# Patient Record
Sex: MALE | Race: WHITE | NOT HISPANIC OR LATINO | Employment: FULL TIME
[De-identification: names, ages, dates, MRNs, and addresses within clinical notes are randomized per-mention and may not be internally consistent; named-entity substitution may affect disease eponyms.]

---

## 2018-10-18 ENCOUNTER — RECORDS - HEALTHEAST (OUTPATIENT)
Dept: ADMINISTRATIVE | Facility: OTHER | Age: 48
End: 2018-10-18

## 2018-11-05 ENCOUNTER — RECORDS - HEALTHEAST (OUTPATIENT)
Dept: ADMINISTRATIVE | Facility: OTHER | Age: 48
End: 2018-11-05

## 2018-12-03 ENCOUNTER — OFFICE VISIT - HEALTHEAST (OUTPATIENT)
Dept: INTERNAL MEDICINE | Facility: CLINIC | Age: 48
End: 2018-12-03

## 2018-12-03 ENCOUNTER — COMMUNICATION - HEALTHEAST (OUTPATIENT)
Dept: LAB | Facility: CLINIC | Age: 48
End: 2018-12-03

## 2018-12-03 DIAGNOSIS — N20.0 NEPHROLITHIASIS: ICD-10-CM

## 2018-12-03 DIAGNOSIS — Z23 NEED FOR TETANUS BOOSTER: ICD-10-CM

## 2018-12-03 DIAGNOSIS — Z01.810 PREOPERATIVE CARDIOVASCULAR EXAMINATION: ICD-10-CM

## 2018-12-03 DIAGNOSIS — Z13.220 LIPID SCREENING: ICD-10-CM

## 2018-12-03 DIAGNOSIS — Z78.9 NONSMOKER: ICD-10-CM

## 2018-12-03 LAB
ANION GAP SERPL CALCULATED.3IONS-SCNC: 10 MMOL/L (ref 5–18)
BUN SERPL-MCNC: 13 MG/DL (ref 8–22)
CALCIUM SERPL-MCNC: 9.5 MG/DL (ref 8.5–10.5)
CHLORIDE BLD-SCNC: 102 MMOL/L (ref 98–107)
CHOLEST SERPL-MCNC: 176 MG/DL
CO2 SERPL-SCNC: 28 MMOL/L (ref 22–31)
CREAT SERPL-MCNC: 0.87 MG/DL (ref 0.7–1.3)
ERYTHROCYTE [DISTWIDTH] IN BLOOD BY AUTOMATED COUNT: 11.3 % (ref 11–14.5)
FASTING STATUS PATIENT QL REPORTED: NORMAL
GFR SERPL CREATININE-BSD FRML MDRD: >60 ML/MIN/1.73M2
GLUCOSE BLD-MCNC: 92 MG/DL (ref 70–125)
HCT VFR BLD AUTO: 50.2 % (ref 40–54)
HDLC SERPL-MCNC: 67 MG/DL
HGB BLD-MCNC: 17.2 G/DL (ref 14–18)
LDLC SERPL CALC-MCNC: 94 MG/DL
MCH RBC QN AUTO: 31.1 PG (ref 27–34)
MCHC RBC AUTO-ENTMCNC: 34.3 G/DL (ref 32–36)
MCV RBC AUTO: 91 FL (ref 80–100)
PLATELET # BLD AUTO: 136 THOU/UL (ref 140–440)
PMV BLD AUTO: 8.5 FL (ref 7–10)
POTASSIUM BLD-SCNC: 4.2 MMOL/L (ref 3.5–5)
RBC # BLD AUTO: 5.54 MILL/UL (ref 4.4–6.2)
SODIUM SERPL-SCNC: 140 MMOL/L (ref 136–145)
TRIGL SERPL-MCNC: 75 MG/DL
WBC: 4 THOU/UL (ref 4–11)

## 2018-12-03 ASSESSMENT — MIFFLIN-ST. JEOR: SCORE: 1468.87

## 2018-12-13 ENCOUNTER — SURGERY - HEALTHEAST (OUTPATIENT)
Dept: SURGERY | Facility: CLINIC | Age: 48
End: 2018-12-13

## 2018-12-13 ENCOUNTER — ANESTHESIA - HEALTHEAST (OUTPATIENT)
Dept: SURGERY | Facility: CLINIC | Age: 48
End: 2018-12-13

## 2018-12-13 ASSESSMENT — MIFFLIN-ST. JEOR: SCORE: 1441.65

## 2018-12-30 ENCOUNTER — OFFICE VISIT - HEALTHEAST (OUTPATIENT)
Dept: FAMILY MEDICINE | Facility: CLINIC | Age: 48
End: 2018-12-30

## 2018-12-30 DIAGNOSIS — K29.00 ACUTE GASTRITIS WITHOUT HEMORRHAGE, UNSPECIFIED GASTRITIS TYPE: ICD-10-CM

## 2018-12-31 LAB
H PYLORI AG STL QL IA: NORMAL
REPORT STATUS: NORMAL
SPECIMEN DESCRIPTION: NORMAL

## 2019-01-03 ENCOUNTER — COMMUNICATION - HEALTHEAST (OUTPATIENT)
Dept: INTERNAL MEDICINE | Facility: CLINIC | Age: 49
End: 2019-01-03

## 2019-02-04 ENCOUNTER — RECORDS - HEALTHEAST (OUTPATIENT)
Dept: ADMINISTRATIVE | Facility: OTHER | Age: 49
End: 2019-02-04

## 2019-06-29 DIAGNOSIS — Z82.79 FAMILY HISTORY OF VON HIPPEL-LINDAU SYNDROME: Primary | ICD-10-CM

## 2019-06-29 DIAGNOSIS — Z84.81 FAMILY HISTORY OF GENE MUTATION: ICD-10-CM

## 2019-06-29 NOTE — PROGRESS NOTES
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    Dinah Vincent MS, [facility]  Genetic Counselor  Ph: [phone]  Pager: [phone]

## 2019-07-11 ENCOUNTER — MEDICAL CORRESPONDENCE (OUTPATIENT)
Dept: HEALTH INFORMATION MANAGEMENT | Facility: CLINIC | Age: 49
End: 2019-07-11

## 2019-07-11 ENCOUNTER — OFFICE VISIT (OUTPATIENT)
Dept: CONSULT | Facility: CLINIC | Age: 49
End: 2019-07-11
Attending: GENETIC COUNSELOR, MS
Payer: COMMERCIAL

## 2019-07-11 DIAGNOSIS — Z84.81 FAMILY HISTORY OF GENE MUTATION: ICD-10-CM

## 2019-07-11 DIAGNOSIS — Z82.79 FAMILY HISTORY OF VON HIPPEL-LINDAU SYNDROME: Primary | ICD-10-CM

## 2019-07-11 PROCEDURE — 36415 COLL VENOUS BLD VENIPUNCTURE: CPT | Performed by: GENETIC COUNSELOR, MS

## 2019-07-11 PROCEDURE — 40000072 ZZH STATISTIC GENETIC COUNSELING, < 16 MIN: Mod: ZF | Performed by: GENETIC COUNSELOR, MS

## 2019-07-11 NOTE — LETTER
Cancer Risk Management  Program Locations    [facility]  [facility]  Mailing Address  Cancer Risk Management Program  [facility]  [address]  [place] [address]    New patient appointments  [phone]  July 19, 2019    Og Boyle  [address] [address]      Dear Og,    It was a pleasure meeting with you at the [facility] on 7-11-19.  Here is a copy of the progress note from your recent genetic counseling visit to the Cancer Risk Management Program.  If you have any additional questions, please feel free to call.    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   He is at clinic today with his daughter, and they are here today to review available genetic testing options.  (I had met previously with Og's wife and Og's son in April for genetic counseling and to coordinate Og's son's genetic testing.)     Personal History:  Og is a 49 year old male. He does not have any personal history of cancer.      Family History: A detailed family history was taken during the genetic counseling session prior to  Og's son's genetic testing (see pedigree scanned into Epic):      Earlier this year,  Og's son was diagnosed with bilateral pheochromocytomas after imaging was done when he was found to have elevated urine VMA and significantly elevated plasma levels of norepinephrine and normetanephrine as part of a work-up for unexplained episodes of nausea/vomitting, often accompanied by palpitations and  "sweating.  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   Og's son had his pheochromocytomas surgically removed in June, and it is reported that it is planned that his carotid body tumor will be removed in August.       Og's son underwent a genetic testing panel for genes that are known to be associated with hereditary cancer syndromes that show an increased risk for pheochomocytoma, including:  FH, MAX, MEN1, NF1, RET, SDHA, SDHAF2, SDHB, SDHC, SDHD, [id number], and VHL.   Og's son's genetic testing noted a mutation (\"likely pathogenic variant\") in the VHL gene called c.I151T. This mutation has been reported in several other individuals with VHL syndrome, and therefore, is thought be consistent with a diagnosis of Von Hippel-Lindau syndrome in Og's son.  His genetic testing was negative for the other genes on this genetic testing panel.      With respect to Og's family history of cancer, at my previous genetic counseling appointment with his wife and son, it was reported that Og's maternal grandmother had a history of glioblastoma at a later age.  It was reported that Og's father had small cell lung cancer at an older age; it was reported that this individual had a past history of smoking.  It was also reported that Og has a paternal aunt that was diagnosed with breast cancer in her late 50s.  Lastly, it was reported that his paternal grandfather had colon cancer at an older age.        It was reported that Og's family is of Kosovan, [place], and Somali ancestry.      Discussion:      As noted above,  Og's son has been diagnosed with Von Hippel-Lindau (VHL) syndrome based on his clinical presentation and the identification of a mutation in the VHL gene that has been reported in other individuals with VHL syndrome.   I " had previously reviewed these test results and their implications with Og over the phone after his son's results were initially obtained.       Og is aware that that VHL syndrome is associated with an increased risk for a variety of types of tumors/cancers.  Von Hippel-Lindau (VHL) syndrome is an inherited disorder characterized by the formation of tumors and cysts in many different parts of the body. Tumors may be either noncancerous or cancerous and most frequently appear during young adulthood; however, the signs and symptoms of VHL syndrome can occur throughout life. Tumors seen in VHL syndrome include hemangioblastomas (often of the brain, spine, and/or retina), pheochromocytoma, endolymphatic sac tumors, renal clear cell carcinoma, and pancreatic neuroendocrine tumor.  In addition, individuals with VHL syndrome can develop kidney, pancreatic, and genital tract cysts.   The exact presentation of VHL syndrome can be different even for individuals within the same family, in that there can be varying tumor types and ages of onset for different family members.       Og reports no additional questions about VHL and did not feel that he needed more resources about VHL syndrome at this time.       We had previously reviewed that mutations in the VHL gene are inherited in an autosomal dominant pattern. We talked about that given that there is no other family history of cancers or tumors commonly associated with VHL syndrome, it seems most likely that the VHL mutation seen in  Og's son was a new gene mutation present in the sperm or egg that led to his son.  In that situation, we would expect the chance that  Og or his wife or their other child has this gene mutation themselves is likely very low.  However, we talked about that because this condition can have very variable presentations from one family member to the next, it is recommended that Og and his wife also have testing for the VHL gene  "mutation see in their son.   Og's wife had her blood drawn for this testing earlier this week.      We  have briefly talked about that rarely, one parent can also be \"mosaic\" for a VHL gene mutation, meaning that some of their cells are normal and some are not normal and have the VHL gene mutation.  Sometimes mosaicism might involve some cells in multiple areas of the body (and might be picked up by blood testing in some cases), and sometimes it might only involve the cells that make sperm or egg (in which case this would not be detected in a blood test in a parent).  We talked about that this is why genetic testing is recommended for all siblings of individuals with a VHL gene mutation.   Og's other child plans to have her blood drawn for testing today as well.      We had previously review ed the autosomal dominant pattern of inheritance associated with VHL syndrome with respect to risks to Og's son's future children.  We talked about that we anticipate that any child that Og's son has in the future would have a 50% chance of inheriting a copy of the VHL gene mutation that was identified in him (and therefore, also have VHL syndrome).  Therefore, genetic testing is recommended for any future children that  Og's son might have in the future.       We talked about that given that  Og's reported cancer family history is not particularly suggestive of a hereditary cancer syndrome, genetic testing for other cancer susceptibility gene mutations (beyond his son's VHL gene mutation) is not indicated for  Og at this time.      Og's test results will either come back negative (meaning he is found to not have a copy of his son's VHL gene mutation) or positive (meaning he is found to have a copy of his son's VHL gene mutation).   If he is negative, this would make a diagnosis of VHL syndrome in him very, very unlikely; additionally, a negative result would mean that we would not expect Og's " siblings or other relatives to be at increased risk for VHL syndrome.      We had previously talked about that genetic testing for  Og for his son's VHL gene mutation is medically indicated because the information from genetic testing (particularly if he tests positive) may determine additional cancer screening recommendations for Og, per current guidelines for surveillance for individuals who carry a VHL mutation. If he were to test positive for this gene mutation, this could have important implications for his relatives as well, as they also might then be at risk to have VHL syndrome.   These recommendations will be discussed in detail once genetic testing is completed.     Plan:  1) Today,  Og decided to proceed with genetic testing for the VHL gene mutation seen in his son.  Therefore, consent was reviewed and signed for this testing.   He then went to the laboratory have his blood drawn to be sent to [facility] for analysis; this is the testing laboratory that also performed his son's testing.     2)Test results should be available within a couple of weeks.     3)I plan to call Og with his test results when they become available.     Dinah Vincent MS, [facility]  Genetic Counselor  Ph: [phone]     Face to face time: 15 minutes

## 2019-07-19 NOTE — PROGRESS NOTES
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  He is at clinic today with his daughter, and they are here today to review available genetic testing options.  (I had met previously with Og's wife and Og's son in April for genetic counseling and to coordinate Og's son's genetic testing.)     Personal History:  Og is a 49 year old male. He does not have any personal history of cancer.      Family History: A detailed family history was taken during the genetic counseling session prior to Og's son's genetic testing (see pedigree scanned into Epic):      Earlier this year, Og's son was diagnosed with bilateral pheochromocytomas after imaging was done when he was found to have elevated urine VMA and significantly elevated plasma levels of norepinephrine and normetanephrine as part of a work-up for unexplained episodes of nausea/vomitting, often accompanied by palpitations and sweating.  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  Og's son had his pheochromocytomas surgically removed in June, and it is reported that it is planned that his carotid body tumor will be removed in August.       Og's son underwent a genetic testing panel for genes that are known to be associated with hereditary cancer syndromes that show an increased risk for pheochomocytoma, including:  FH, MAX, MEN1, NF1, RET, SDHA, SDHAF2, SDHB, SDHC, SDHD, [id number], and VHL.  Og's son's genetic testing noted a mutation (\"likely pathogenic variant\") in the VHL gene called c.I151T. 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  His genetic testing was negative for the other genes on this genetic testing panel.      With respect to Og's family history of cancer, at my previous genetic counseling appointment with his wife and son, it was reported that Og's maternal grandmother had a history of glioblastoma at a later age.  It was reported that Og's father had small cell lung cancer at an older age; it was reported that this individual had a past history of smoking.  It was also reported that Og has a paternal aunt that was diagnosed with breast cancer in her late 50s.  Lastly, it was reported that his paternal grandfather had colon cancer at an older age.        It was reported that Og's family is of [place], Croatian, and Sami ancestry.      Discussion:      As noted above, Og's son has been diagnosed with Von Hippel-Lindau (VHL) syndrome based on his clinical presentation and the identification of a mutation in the VHL gene that has been reported in other individuals with VHL syndrome.   I had previously reviewed these test results and their implications with Og over the phone after his son's results were initially obtained.       Og is aware that that VHL syndrome is associated with an increased risk for a variety of types of tumors/cancers.  Von Hippel-Lindau (VHL) syndrome is an inherited disorder characterized by the formation of tumors and cysts in many different parts of the body. Tumors may be either noncancerous or cancerous and most frequently appear during young adulthood; however, the signs and symptoms of VHL syndrome can occur throughout life. Tumors seen in VHL syndrome include hemangioblastomas (often of the brain, spine, and/or retina), pheochromocytoma, endolymphatic sac tumors, renal clear cell carcinoma, and pancreatic  "neuroendocrine tumor.  In addition, individuals with VHL syndrome can develop kidney, pancreatic, and genital tract cysts.  The exact presentation of VHL syndrome can be different even for individuals within the same family, in that there can be varying tumor types and ages of onset for different family members.       Og reports no additional questions about VHL and did not feel that he needed more resources about VHL syndrome at this time.       We had previously reviewed that mutations in the VHL gene are inherited in an autosomal dominant pattern. We talked about that given that there is no other family history of cancers or tumors commonly associated with VHL syndrome, it seems most likely that the VHL mutation seen in Og's son was a new gene mutation present in the sperm or egg that led to his son.  In that situation, we would expect the chance that Og or his wife or their other child has this gene mutation themselves is likely very low.  However, we talked about that because this condition can have very variable presentations from one family member to the next, it is recommended that Og and his wife also have testing for the VHL gene mutation see in their son.  Og's wife had her blood drawn for this testing earlier this week.      We have briefly talked about that rarely, one parent can also be \"mosaic\" for a VHL gene mutation, meaning that some of their cells are normal and some are not normal and have the VHL gene mutation.  Sometimes mosaicism might involve some cells in multiple areas of the body (and might be picked up by blood testing in some cases), and sometimes it might only involve the cells that make sperm or egg (in which case this would not be detected in a blood test in a parent).  We talked about that this is why genetic testing is recommended for all siblings of individuals with a VHL gene mutation.  Og's other child plans to have her blood drawn for testing today as " well.      We had previously reviewed the autosomal dominant pattern of inheritance associated with VHL syndrome with respect to risks to Og's son's future children.  We talked about that we anticipate that any child that Og's son has in the future would have a 50% chance of inheriting a copy of the VHL gene mutation that was identified in him (and therefore, also have VHL syndrome).  Therefore, genetic testing is recommended for any future children that Og's son might have in the future.       We talked about that given that Og's reported cancer family history is not particularly suggestive of a hereditary cancer syndrome, genetic testing for other cancer susceptibility gene mutations (beyond his son's VHL gene mutation) is not indicated for Og at this time.      Og's test results will either come back negative (meaning he is found to not have a copy of his son's VHL gene mutation) or positive (meaning he is found to have a copy of his son's VHL gene mutation).  If he is negative, this would make a diagnosis of VHL syndrome in him very, very unlikely; additionally, a negative result would mean that we would not expect Og's siblings or other relatives to be at increased risk for VHL syndrome.      We had previously talked about that genetic testing for Og for his son's VHL gene mutation is medically indicated because the information from genetic testing (particularly if he tests positive) may determine additional cancer screening recommendations for Og, per current guidelines for surveillance for individuals who carry a VHL mutation. If he were to test positive for this gene mutation, this could have important implications for his relatives as well, as they also might then be at risk to have VHL syndrome.   These recommendations will be discussed in detail once genetic testing is completed.     Plan:  1) Today, Og decided to proceed with genetic testing for the VHL gene  mutation seen in his son.  Therefore, consent was reviewed and signed for this testing.  He then went to the laboratory have his blood drawn to be sent to [facility] for analysis; this is the testing laboratory that also performed his son's testing.     2)Test results should be available within a couple of weeks.     3)I plan to call Og with his test results when they become available.     Dinah Vincent MS, [facility]  Genetic Counselor  Ph: [phone]     Face to face time: 15 minutes

## 2019-07-23 ENCOUNTER — TELEPHONE (OUTPATIENT)
Dept: ONCOLOGY | Facility: CLINIC | Age: 49
End: 2019-07-23

## 2019-07-23 NOTE — TELEPHONE ENCOUNTER
I spoke with Og over the phone about his genetic test results.  We reviewed that his genetic testing was NEGATIVE for the VHL gene mutation seen in his son.  (We also reviewed that his daughter's genetic testing results are also NEGATIVE for the VHL gene mutation.)    Because of his negative genetic test results, we would NOT expect Og to be at risk for Von Hippel Lindau (VHL) syndrome.  We talked about that because of his negative genetic testing results (and his reported family history), there are not additional cancer screening recommendations at this time for him beyond general population cancer screening guidelines.      A copy of these test results were forwarded to Og.    Dinah Vincent MS, [facility]  Genetic Counselor  Ph: [phone]

## 2019-08-16 LAB — LAB SCANNED RESULT: NORMAL

## 2019-10-07 ENCOUNTER — RECORDS - HEALTHEAST (OUTPATIENT)
Dept: ADMINISTRATIVE | Facility: OTHER | Age: 49
End: 2019-10-07

## 2020-12-18 ENCOUNTER — OFFICE VISIT - HEALTHEAST (OUTPATIENT)
Dept: INTERNAL MEDICINE | Facility: CLINIC | Age: 50
End: 2020-12-18

## 2020-12-18 DIAGNOSIS — Z12.5 SCREENING PSA (PROSTATE SPECIFIC ANTIGEN): ICD-10-CM

## 2020-12-18 DIAGNOSIS — N20.0 NEPHROLITHIASIS: ICD-10-CM

## 2020-12-18 DIAGNOSIS — Z00.00 ROUTINE PHYSICAL EXAMINATION: ICD-10-CM

## 2020-12-18 DIAGNOSIS — Z13.220 LIPID SCREENING: ICD-10-CM

## 2020-12-18 DIAGNOSIS — Z98.890 S/P COLONOSCOPY: ICD-10-CM

## 2020-12-18 LAB
CHOLEST SERPL-MCNC: 162 MG/DL
FASTING STATUS PATIENT QL REPORTED: YES
HDLC SERPL-MCNC: 59 MG/DL
LDLC SERPL CALC-MCNC: 87 MG/DL
PSA SERPL-MCNC: 0.8 NG/ML (ref 0–2.5)
TRIGL SERPL-MCNC: 78 MG/DL

## 2020-12-18 RX ORDER — CETIRIZINE HYDROCHLORIDE 10 MG/1
10 TABLET ORAL DAILY
Status: SHIPPED | COMMUNITY
Start: 2020-12-18

## 2020-12-18 ASSESSMENT — MIFFLIN-ST. JEOR: SCORE: 1504.48

## 2021-01-15 ENCOUNTER — HEALTH MAINTENANCE LETTER (OUTPATIENT)
Age: 51
End: 2021-01-15

## 2021-01-18 ENCOUNTER — IMMUNIZATION (OUTPATIENT)
Dept: NURSING | Facility: CLINIC | Age: 51
End: 2021-01-18
Payer: COMMERCIAL

## 2021-01-18 PROCEDURE — 91300 PR COVID VAC PFIZER DIL RECON 30 MCG/0.3 ML IM: CPT

## 2021-01-18 PROCEDURE — 0001A PR COVID VAC PFIZER DIL RECON 30 MCG/0.3 ML IM: CPT

## 2021-02-08 ENCOUNTER — IMMUNIZATION (OUTPATIENT)
Dept: NURSING | Facility: CLINIC | Age: 51
End: 2021-02-08
Attending: FAMILY MEDICINE
Payer: COMMERCIAL

## 2021-02-08 PROCEDURE — 0002A PR COVID VAC PFIZER DIL RECON 30 MCG/0.3 ML IM: CPT

## 2021-02-08 PROCEDURE — 91300 PR COVID VAC PFIZER DIL RECON 30 MCG/0.3 ML IM: CPT

## 2021-06-02 VITALS — WEIGHT: 144.2 LBS | BODY MASS INDEX: 22.93 KG/M2

## 2021-06-02 VITALS — BODY MASS INDEX: 22.6 KG/M2 | WEIGHT: 144 LBS | HEIGHT: 67 IN

## 2021-06-02 VITALS — WEIGHT: 138 LBS | BODY MASS INDEX: 21.66 KG/M2 | HEIGHT: 67 IN

## 2021-06-05 VITALS
BODY MASS INDEX: 23.56 KG/M2 | HEART RATE: 62 BPM | OXYGEN SATURATION: 98 % | WEIGHT: 150.1 LBS | SYSTOLIC BLOOD PRESSURE: 132 MMHG | DIASTOLIC BLOOD PRESSURE: 84 MMHG | HEIGHT: 67 IN

## 2021-06-13 NOTE — PATIENT INSTRUCTIONS - HE
Please follow up if you have any further issues.    You may contact me by phone or MyChart if you are worsening or if things are not improving.    ______________________________________________________________________     Please remember that you can call [phone] to schedule an appointment with me.     You can schedule appointments 24 hours a day, 7 days a week.  Sometimes the best time to schedule an appointment is after clinic hours when less people are calling in.  Weekends are another option for calling in to schedule appointments.

## 2021-06-16 PROBLEM — N20.0 NEPHROLITHIASIS: Status: ACTIVE | Noted: 2018-12-03

## 2021-06-16 PROBLEM — Z98.890 S/P COLONOSCOPY: Status: ACTIVE | Noted: 2019-10-17

## 2021-06-22 NOTE — ANESTHESIA CARE TRANSFER NOTE
Pt breathing spontaneously, follows commands, suctioned extubated. Spontaneous respirations with SFM to PACU. VSS.      Last vitals:   Vitals:    12/13/18 1700   BP: 125/79   Pulse: 87   Resp: 14   Temp: 37.2  C (99  F)   SpO2: 100%     Patient's level of consciousness is drowsy  Spontaneous respirations: yes  Maintains airway independently: yes  Dentition unchanged: yes  Oropharynx: oropharynx clear of all foreign objects    QCDR Measures:  ASA# 20 - Surgical Safety Checklist: WHO surgical safety checklist completed prior to induction    PQRS# 430 - Adult PONV Prevention: 4558F - Pt received => 2 anti-emetic agents (different classes) preop & intraop  ASA# 8 - Peds PONV Prevention: NA - Not pediatric patient, not GA or 2 or more risk factors NOT present  PQRS# 424 - Dede-op Temp Management: 4559F - At least one body temp DOCUMENTED => 35.5C or 95.9F within required timeframe  PQRS# 426 - PACU Transfer Protocol: - Transfer of care checklist used  ASA# 14 - Acute Post-op Pain: ASA14B - Patient did NOT experience pain >= 7 out of 10

## 2021-06-22 NOTE — ANESTHESIA POSTPROCEDURE EVALUATION
Patient: Og Boyle  CYSTOSCOPY, RIGHT URETEROSCOPY, LITHOTRIPSY USING LASER, AND URETERAL STENT INSERTION, RIGHT  Anesthesia type: general    Patient location: PACU  Last vitals:   Vitals:    12/13/18 1715   BP: 124/75   Pulse: 87   Resp: 16   Temp:    SpO2: 100%     Post vital signs: stable  Level of consciousness: awake and responds to simple questions  Post-anesthesia pain: pain controlled  Post-anesthesia nausea and vomiting: no  Pulmonary: unassisted, return to baseline  Cardiovascular: stable and blood pressure at baseline  Hydration: adequate  Anesthetic events: no    QCDR Measures:  ASA# 11 - Dede-op Cardiac Arrest: ASA11B - Patient did NOT experience unanticipated cardiac arrest  ASA# 12 - Dede-op Mortality Rate: ASA12B - Patient did NOT die  ASA# 13 - PACU Re-Intubation Rate: ASA13B - Patient did NOT require a new airway mgmt  ASA# 10 - Composite Anes Safety: ASA10A - No serious adverse event    Additional Notes:

## 2021-06-22 NOTE — TELEPHONE ENCOUNTER
Called and LVM for pt - relayed message and requested that pt call back if needed appt for follow up.  -

## 2021-06-22 NOTE — ANESTHESIA PREPROCEDURE EVALUATION
Anesthesia Evaluation      Patient summary reviewed   No history of anesthetic complications     Airway   Mallampati: I  Neck ROM: full   Pulmonary - negative ROS and normal exam    breath sounds clear to auscultation  (-) sleep apnea, not a smoker                         Cardiovascular - negative ROS  Exercise tolerance: > or = 4 METS  (-) murmur  Rhythm: regular  Rate: normal,    no murmur      Neuro/Psych - negative ROS     Endo/Other - negative ROS   (-) no obesity     GI/Hepatic/Renal    (+)   chronic renal disease,           Dental - normal exam                        Anesthesia Plan  Planned anesthetic: general endotracheal    ASA 1   Induction: intravenous   Anesthetic plan and risks discussed with: patient  Anesthesia plan special considerations: antiemetics,   Post-op plan: routine recovery

## 2021-06-22 NOTE — TELEPHONE ENCOUNTER
Patient left me a message on my voice mail saying he is doing a lot better at this time.  He does not need follow up at this time.

## 2021-06-22 NOTE — TELEPHONE ENCOUNTER
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    I would be happy to see him next week at 4:30 pm on Monday or in a reserved slot in another time or on a Thursday am if he would like.    If he has no other concerns, then please give him my voice mail at [phone] if he needs it.    Lukas Cruz MD  [facility] [[email or web address]]  1/3/2019, 10:48 PM

## 2021-06-22 NOTE — PROGRESS NOTES
Name: Og Boyle  Age: 48 y.o.  Gender: male  : 1970  Date of Encounter: 2018      HPI:  Og Boyle is a 48 y.o.  male who presents to the clinic today by wife with concerns of epigastric discomfort.  Patient reports symptoms started approximately 7 days earlier.  Describes pain as burning or gnawing sensation.  Pain is intermittent rated a 4-6 out of 10 at its worst. Pain is worth worse with swallowing, burping eating, drinking fluids or lying down.  No radiation of pain.  Has some associated belching and burping.  Patient reports symptoms started after having taken a lot of ibuprofen in the last month.  He was diagnosed with a kidney stone and was taking B Profen his pain medication.  He reports he started taking over-the-counter omeprazole 20 mg daily with maybe slight improvement in his symptoms.  He denies associated fever, chills, yellowing skin or eyes, nausea, vomiting, hematemesis, hematochezia, lightheadedness, dizziness, chest pain, shortness of breath, palpitations and weakness.  No history of gallbladder disease.  Patient has discontinued caffeine and alcohol since onset of his symptoms.    Current Medication:   Medications reviewed and updated.    Current Outpatient Medications:      omeprazole (PRILOSEC) 20 MG capsule, Take 20 mg by mouth 2 (two) times a day before meals., Disp: , Rfl:      HYDROcodone-acetaminophen (NORCO) 5-325 mg per tablet, Take 1-2 tablets by mouth every 6 (six) hours as needed for pain., Disp: 30 tablet, Rfl: 0     phenazopyridine (PYRIDIUM) 200 MG tablet, Take 1 tablet (200 mg total) by mouth 3 (three) times a day as needed for pain., Disp: 9 tablet, Rfl: 0    Past Med / Surg History:  Past Medical History:   Diagnosis Date     Nephrolithiasis      Nonsmoker      Tubular adenoma of colon 2013     Past Surgical History:   Procedure Laterality Date     CA CYSTO/URETERO W/LITHOTRIPSY &INDWELL STENT INSRT Right 2018    Procedure: CYSTOSCOPY,  RIGHT URETEROSCOPY, LITHOTRIPSY USING LASER, AND URETERAL STENT INSERTION, RIGHT;  Surgeon: Dontrell Atkins MD;  Location: [facility] Main OR;  Service: Urology     WISDOM TOOTH EXTRACTION         Fam / Soc History:  Family History   Problem Relation Age of Onset     Hypertension Mother      Hypertension Father      Lung cancer Father         Small cell     No Medical Problems Sister      No Medical Problems Sister      No Medical Problems Son      No Medical Problems Daughter      Social History     Socioeconomic History     Marital status:      Spouse name: Not on file     Number of children: 2     Years of education: Not on file     Highest education level: Not on file   Social Needs     Financial resource strain: Not on file     Food insecurity - worry: Not on file     Food insecurity - inability: Not on file     Transportation needs - medical: Not on file     Transportation needs - non-medical: Not on file   Occupational History     Occupation: Dermatologist   Tobacco Use     Smoking status: Never Smoker     Smokeless tobacco: Never Used   Substance and Sexual Activity     Alcohol use: Not on file     Drug use: No     Sexual activity: Not on file   Other Topics Concern     Not on file   Social History Narrative    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        2 children, 1 son and 1 daughter.  .       ROS:  14 point review of systems unremarkable except as mentioned in HPI  Review of Systems   Constitutional: Negative for chills, fatigue and fever.   Eyes:        No yellowing of eyes   Respiratory: Negative for cough, chest tightness and shortness of breath.    Cardiovascular: Positive for chest pain.   Gastrointestinal: Positive for abdominal pain. Negative for blood in stool, diarrhea, nausea and vomiting.       Objective:  Vitals: /64 (Patient Site: Right Arm, Patient Position:  Sitting, Cuff Size: Adult Regular)   Pulse 90   Temp 98.3  F (36.8  C) (Oral)   Wt 144 lb 3.2 oz (65.4 kg)   SpO2 96%   BMI 22.93 kg/m      Gen: Alert, awake, well appearing  HEENT: NC, AT,   Eyes:  EOMI.  Pupils equally round and reactive to light. Conjunctivae clear.  Sclera non-icteric.  Nose:  Nasal mucosa pink, septum midline.  No sinus tenderness  Mouth:  MMM. Oropharynx clear. No tonsillar exudate. Teeth, gum, tongue and lips clear.  Neck:  Supple, FROM, No lymphandenpathy, No TM  Heart: Regular rate and rhythm; normal S1 and S2; no murmurs, gallops, or rubs.  Peripheral Vessels: Normal pulses and perfusion.  Lungs: Unlabored respirations; symmetric chest expansion; clear breath sounds.  Abdomen:  Bowel sounds present.  Soft, mild epigastric tenderness without right upper quadrant tenderness., no guarding or rebound, no hepatosplenomegaly,  No masses palpable.  Back:  Spine straight, no obvious deformities, no spinal or CVA tenderness.  Skin: Normal turgor and without lesions or rashes.  Mental Status: Alert, oriented, in no distress. Mood and affect appropriate.  Neuro: Normal tone; no focal deficits appreciated.Gait and stance normal.     Pertinent results / imaging:  H. pylori pending    Assessment: Gastritis most probably from recent regular ibuprofen use.  H. pylori is pending at this time.    Plan: Advised use of omeprazole 20 mg twice daily for the next 2 weeks then decrease to 20 mg once daily for 2 weeks.  H. pylori pending at this time.  He will be notified of results when available.  Advised follow-up with his PCP in 2 weeks for recheck.  Reviewed expected course, duration of symptoms and reasons to return sooner for reevaluation.  Patient voiced understanding and was in agreement with plan.      Zainab Xavier MD  12/30/2018

## 2021-06-26 NOTE — PROGRESS NOTES
Progress Notes by Lukas Cruz MD at 12/3/2018  8:00 AM     Author: Lukas Cruz MD Service: -- Author Type: Physician    Filed: 12/3/2018  7:53 PM Encounter Date: 12/3/2018 Status: Signed    : Lukas Cruz MD (Physician)            [facility]    Comprehensive and complex medical care - Chronic disease management - Shared decision making - Care coordination - Compassionate care    Patient advocacy - Rational deprescribing - Minimally disruptive medicine - Ethical focus - Customized care    ______________________________________________________________________     Date of Service: 12/3/2018  Primary Provider: Lukas Cruz MD    Patient Care Team:  Lukas Cruz MD as PCP - General (Internal Medicine)  Dontrell Alexander MD as Physician (Urology)     ______________________________________________________________________     Patient's Pharmacy:    [id number] - [place], [place] - [address]  [address]  [facility] [address]  Phone: [phone] Fax: [phone]     Patient's Contacts:  Name Home Phone Work Phone Mobile Phone Relationship Lgl Nam LEYVAMERCY [phone] Spouse      Patient's Insurance:    Payor: BLUE CROSS / Plan: BLUE CROSS OF [place] / Product Type: PPO/POS/FFS /     ______________________________________________________________________     Preoperative examination    Og Leyva is a 48 y.o. male (1970) who I am asked to see by his surgeon regarding his fitness for upcoming surgery.      Chief Complaint   Patient presents with   ? Pre-op Exam     12/13/18, cystoscopy stone extraction, [facility], dr alexander     ______________________________________________________________________     History of present illness:    Patient comes in today for preoperative evaluation prior to his planned cystoscopy and lithotripsy.  He is scheduled to have this under general  anesthetic.  He is not so sure he would need to have general anesthetic but he will discuss with anesthesia.  He has never had general anesthetic.  He has no issues with shortness of breath or chest pain.    He was found to have nephrolithiasis in October.  He had some right-sided upper abdominal pain and may be a little bit to the side.  He did develop gross hematuria.  He did have a CT scan done through [facility].  This apparently showed a stone present.  I do not have these records for review.  We will request them through [facility].  He is doing well from a symptom standpoint at this time.  Apparently he has other stones present in the kidney itself.  He has never had a previous stones or symptoms of stones.    He is feeling well otherwise and has no major concerns.  He had no signs of urinary infection.  They did not do any blood work.  He denies any fevers or chills.  Nothing seemed to bring this on.    He would like to have lipid screening and this was ordered today.  His tetanus was updated as well.    He has had a family history of polyps in the past.  He will consider doing colonoscopy again in the next year.  His last one was in 2013.  He had a diminutive polyp which was a tubular adenoma.    ______________________________________________________________________     Patient Active Problem List   Diagnosis   ? Nonsmoker   ? Nephrolithiasis     Past Surgical History:   Procedure Laterality Date   ? WISDOM TOOTH EXTRACTION        Social History     Socioeconomic History   ? Marital status:      Spouse name: None   ? Number of children: 2   ? Years of education: None   ? Highest education level: None   Social Needs   ? Financial resource strain: None   ? Food insecurity - worry: None   ? Food insecurity - inability: None   ? Transportation needs - medical: None   ? Transportation needs - non-medical: None   Occupational History   ? Occupation: Dermatologist   Tobacco Use   ? Smoking  "status: Never Smoker   ? Smokeless tobacco: Never Used   Substance and Sexual Activity   ? Alcohol use: None   ? Drug use: None   ? Sexual activity: None   Other Topics Concern   ? 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        2 children, 1 son and 1 daughter.  .      Family History   Problem Relation Age of Onset   ? Hypertension Mother    ? Hypertension Father    ? Lung cancer Father         Small cell   ? No Medical Problems Sister    ? No Medical Problems Sister    ? No Medical Problems Son    ? No Medical Problems Daughter       Family history is noncontributory otherwise.    Allergies: Patient has no known allergies.     Current medications:  No current outpatient medications on file.       Surgery specific questions:    Anesthesia/family history of complications: No  History of abnormal bleeding: No  Can patient walk a flight of stairs without stopping: Yes    Review of systems:    The 10-system review of systems and health history form for [facility] was completed by patient, reviewed by me, and pertinent problems are reviewed above.   ______________________________________________________________________     Exam:    Wt Readings from Last 3 Encounters:   12/03/18 144 lb (65.3 kg)   02/29/16 147 lb (66.7 kg)     BP Readings from Last 3 Encounters:   12/03/18 138/86   02/29/16 124/76     /86   Pulse 76   Temp 98.2  F (36.8  C) (Oral)   Ht 5' 6.5\" (1.689 m)   Wt 144 lb (65.3 kg)   SpO2 99%   BMI 22.89 kg/m     Patient is in no acute distress.  Mood good.  Insight good.  Eyes nonicteric.  Pupils equal.  Ears clear.  Nose clear.  Throat clear.  Neck is supple.  No cervical adenopathy.  No thyromegaly.  Heart is regular rate and rhythm.  Lungs clear to auscultation bilaterally.  Respiratory effort is good.  Abdomen is soft, nontender, no hepatosplenomegaly.  " Extremities no edema.     ______________________________________________________________________    Diagnostics:    Results for orders placed or performed in visit on 12/03/18   Basic Metabolic Panel   Result Value Ref Range    Sodium 140 136 - 145 mmol/L    Potassium 4.2 3.5 - 5.0 mmol/L    Chloride 102 98 - 107 mmol/L    CO2 28 22 - 31 mmol/L    Anion Gap, Calculation 10 5 - 18 mmol/L    Glucose 92 70 - 125 mg/dL    Calcium 9.5 8.5 - 10.5 mg/dL    BUN 13 8 - 22 mg/dL    Creatinine 0.87 0.70 - 1.30 mg/dL    GFR MDRD Af Amer >60 >60 mL/min/1.73m2    GFR MDRD Non Af Amer >60 >60 mL/min/1.73m2   HM2(CBC w/o Differential)   Result Value Ref Range    WBC 4.0 4.0 - 11.0 thou/uL    RBC 5.54 4.40 - 6.20 mill/uL    Hemoglobin 17.2 14.0 - 18.0 g/dL    Hematocrit 50.2 40.0 - 54.0 %    MCV 91 80 - 100 fL    MCH 31.1 27.0 - 34.0 pg    MCHC 34.3 32.0 - 36.0 g/dL    RDW 11.3 11.0 - 14.5 %    Platelets 136 (L) 140 - 440 thou/uL    MPV 8.5 7.0 - 10.0 fL   Lipid Cascade FASTING   Result Value Ref Range    Cholesterol 176 <=199 mg/dL    Triglycerides 75 <=149 mg/dL    HDL Cholesterol 67 >=40 mg/dL    LDL Calculated 94 <=129 mg/dL    Patient Fasting > 8hrs? Unknown       ______________________________________________________________________     Impression:    1. Preoperative cardiovascular examination    2. Nephrolithiasis    3. Nonsmoker    4. Need for tetanus booster    5. Lipid screening      ______________________________________________________________________     PHQ-2 Total Score: 0 (12/3/2018  7:00 AM)    No Data Recorded  ______________________________________________________________________     Recommendations:    1. Patient is okay to proceed with surgery as planned.  2. No concerns related to surgery.  3. TD updated today.  4. Check blood work today.  See relevant orders and diagnosis associations at the bottom of this note.        Lukas Cruz MD  General Internal Medicine  [facility]     Personal  office fax - [phone]   Voice mail - [phone]  E-mail - [email or web address]      Return in about 2 years (around 12/3/2020) for physical.     No future appointments.     ______________________________________________________________________     Relevant ICD-10 codes and order associations:      ICD-10-CM    1. Preoperative cardiovascular examination Z01.810    2. Nephrolithiasis N20.0 Basic Metabolic Panel     HM2(CBC w/o Differential)   3. Nonsmoker Z78.9    4. Need for tetanus booster Z23 Td, Preservative Free (green label)   5. Lipid screening Z13.220 Lipid Cascade FASTING

## 2021-06-30 NOTE — PROGRESS NOTES
Progress Notes by Lukas Cruz MD at 2020 10:05 AM     Author: Lukas Cruz MD Service: -- Author Type: Physician    Filed: 2020  2:56 PM Encounter Date: 2020 Status: Signed    : Lukas Cruz MD (Physician)       BP Readings from Last 20 Encounters:   20 132/84   18 110/64   18 112/78   18 138/86   16 124/76            [facility]    Comprehensive and complex medical care - Chronic disease management - Shared decision making - Care coordination - Compassionate care    Patient advocacy - Rational deprescribing - Minimally disruptive medicine - Ethical focus - Customized care          Date of Service: 2020  Primary Provider: Lukas Cruz MD    Patient Care Team:  Lukas Cruz MD as PCP - General (Internal Medicine)  Dontrell Atkins MD as Physician (Urology)  Lukas Cruz MD as Assigned PCP     ______________________________________________________________________     Patient's Pharmacy:    [facility]  - [place], [place] - [address] [place] [address] [address]  [address]  [facility] [address]  Phone: [phone] Fax: [phone]     Patient's Contacts:  Name Home Phone Work Phone Mobile Phone Relationship Lgl Grtodd   MERCY BOYLE [phone] Spouse      Patient's Insurance:    Payor/Plan Subscr  Sex Relation Sub. Ins. ID Effective Group Num   1. HEALTH[employer]NER* CINTHIA BOYLE 1970 Male  [id number] Not Eff [id number]                                   [address]   2. BLUE CROSS - * CINTHIA BOYLE 1970 Male  [id number]* Not Eff [id number]                                   [address] [address]          Routine physical - male, age 27-49    Cinthia Boyle is a 50 y.o. male coming in today for a routine physical.  He does not have other issues outside the physical to discuss as well.    Chief Complaint   Patient presents with   ? Annual Exam        Active  Problem List:  Problem List as of 12/18/2020 Reviewed: 12/18/2020  2:53 PM by Lukas Cruz MD       High    Nonsmoker       Low    Nephrolithiasis       Unprioritized    Normal colonoscopy - 2019           Past Medical History:   Diagnosis Date   ? Nephrolithiasis    ? Nonsmoker    ? Normal colonoscopy - 2019 10/17/2019   ? Tubular adenoma of colon 09/2013     Past Surgical History:   Procedure Laterality Date   ? [place] CYSTO/URETERO W/LITHOTRIPSY &INDWELL STENT INSRT Right 12/13/2018    Procedure: CYSTOSCOPY, RIGHT URETEROSCOPY, LITHOTRIPSY USING LASER, AND URETERAL STENT INSERTION, RIGHT;  Surgeon: Dontrell Atkins MD;  Location[facility];  Service: Urology   ? WISDOM TOOTH EXTRACTION       Social History     Occupational History   ? Occupation: Dermatologist   Tobacco Use   ? Smoking status: Never Smoker   ? Smokeless tobacco: Never Used   Substance and Sexual Activity   ? Alcohol use: Not on file   ? Drug use: No   ? 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        2 children, 1 son and 1 daughter.  .      Family History   Problem Relation Age of Onset   ? Hypertension Mother    ? Hypertension Father    ? Lung cancer Father         Small cell   ? No Medical Problems Sister    ? No Medical Problems Sister    ? Von Hippel-Lindau syndrome Son         Bilateral pheochromocytomas.  Spontaneous mutation.   ? No Medical Problems Daughter      Family history is otherwise noncontributory.    Current Outpatient Medications   Medication Sig   ? cetirizine (ZYRTEC) 10 MG tablet Take 10 mg by mouth daily.     Allergies: Patient has no known allergies.  Immunization History   Administered Date(s) Administered   ? Influenza, inj, historic,unspecified 10/01/2012, 10/01/2018, 10/01/2020   ? Td, adult adsorbed, PF 12/03/2018   ?  "Td,adult,historic,unspecified 08/18/2008   ? Tdap 08/18/2008             Generally doing well without issues.  Practice going okay with coronavirus.    No further issues with kidney stones since 2 years ago.    Colonoscopy last year showed no precancerous polyps.    Son was diagnosed to have bilateral pheochromocytomas and VHL.  Having surgery for this.    Would like screening prostate specific antigen (PSA).     The 10-system review of systems and health history form for [facility] was completed by patient, reviewed by me, and pertinent problems are reviewed above.    Exam:     Wt Readings from Last 3 Encounters:   12/18/20 150 lb 1.6 oz (68.1 kg)   12/30/18 144 lb 3.2 oz (65.4 kg)   12/13/18 138 lb (62.6 kg)     BP Readings from Last 3 Encounters:   12/18/20 132/84   12/30/18 110/64   12/13/18 112/78     /84   Pulse 62   Ht 5' 7\" (1.702 m)   Wt 150 lb 1.6 oz (68.1 kg)   SpO2 98%   BMI 23.51 kg/m     The patient is in no acute distress.  Mood good.  Insight good.  No skin lesions or nodules of concern.  Ears are clear.  Nose is clear.  Throat is clear.  Neck is supple  and there is no thyromegaly.  No carotid bruits.  No cervical,or axillary adenopathy.  Heart regular rate and rhythm.  No murmur present.  Lungs clear to auscultation bilaterally.  Respiratory effort is good.  Abdomen is soft, nontender.  No hepatosplenomegaly. No lower extremity edema.    Diagnostics:     Results for orders placed or performed in visit on 12/30/18   H. pylori Antigen, Stool   Result Value Ref Range    Specimen Description Feces     H pylori Antigen SEE NOTES     Report Status FINAL 12/31/2018        Assessment:     1. Routine physical examination    2. Screening PSA (prostate specific antigen)    3. Lipid screening    4. Normal colonoscopy - 2019    5. Nephrolithiasis        Quality review:     PHQ-2 Total Score: 0 (12/18/2020 10:00 AM)      No data " recorded  ______________________________________________________________________     BMI Readings from Last 1 Encounters:   12/18/20 23.51 kg/m        Plan:     1. Check blood work today.  See relevant orders and diagnosis associations at the bottom of this note.   2. Up to date on vaccination   3. Follow up every 2 years at this time.    Lukas Cruz MD  General Internal Medicine  [facility]    Return in about 2 years (around 12/18/2022) for physical.     No future appointments.      ______________________________________________________________________     Relevant ICD-10 codes and order associations:      ICD-10-CM    1. Routine physical examination  Z00.00    2. Screening PSA (prostate specific antigen)  Z12.5 PSA, Annual Screen (Prostatic-Specific Antigen)   3. Lipid screening  Z13.220 Lipid Cascade FASTING   4. Normal colonoscopy - 2019  Z98.890    5. Nephrolithiasis  N20.0

## 2021-09-04 ENCOUNTER — HEALTH MAINTENANCE LETTER (OUTPATIENT)
Age: 51
End: 2021-09-04

## 2022-02-19 ENCOUNTER — HEALTH MAINTENANCE LETTER (OUTPATIENT)
Age: 52
End: 2022-02-19

## 2022-10-22 ENCOUNTER — HEALTH MAINTENANCE LETTER (OUTPATIENT)
Age: 52
End: 2022-10-22

## 2022-12-04 ASSESSMENT — ENCOUNTER SYMPTOMS
NAUSEA: 0
COUGH: 0
NERVOUS/ANXIOUS: 0
DIARRHEA: 0
MYALGIAS: 0
HEMATOCHEZIA: 0
CONSTIPATION: 0
FREQUENCY: 0
PARESTHESIAS: 0
DYSURIA: 0
SORE THROAT: 0
HEADACHES: 0
HEMATURIA: 0
HEARTBURN: 0
PALPITATIONS: 0
EYE PAIN: 0
FEVER: 0
WEAKNESS: 0
DIZZINESS: 0
CHILLS: 0
ABDOMINAL PAIN: 0
ARTHRALGIAS: 0
SHORTNESS OF BREATH: 0
JOINT SWELLING: 0

## 2022-12-05 ENCOUNTER — OFFICE VISIT (OUTPATIENT)
Dept: INTERNAL MEDICINE | Facility: CLINIC | Age: 52
End: 2022-12-05
Payer: COMMERCIAL

## 2022-12-05 VITALS
HEART RATE: 63 BPM | BODY MASS INDEX: 24.01 KG/M2 | SYSTOLIC BLOOD PRESSURE: 124 MMHG | HEIGHT: 67 IN | RESPIRATION RATE: 18 BRPM | DIASTOLIC BLOOD PRESSURE: 86 MMHG | WEIGHT: 153 LBS | TEMPERATURE: 98.6 F | OXYGEN SATURATION: 99 %

## 2022-12-05 DIAGNOSIS — Z12.5 SCREENING FOR PROSTATE CANCER: ICD-10-CM

## 2022-12-05 DIAGNOSIS — Z00.00 ROUTINE PHYSICAL EXAMINATION: Primary | ICD-10-CM

## 2022-12-05 DIAGNOSIS — N20.0 NEPHROLITHIASIS: ICD-10-CM

## 2022-12-05 DIAGNOSIS — Z13.220 LIPID SCREENING: ICD-10-CM

## 2022-12-05 DIAGNOSIS — Z13.228 SCREENING FOR METABOLIC DISORDER: ICD-10-CM

## 2022-12-05 LAB
ANION GAP SERPL CALCULATED.3IONS-SCNC: 9 MMOL/L (ref 7–15)
BUN SERPL-MCNC: 13.2 MG/DL (ref 6–20)
CALCIUM SERPL-MCNC: 8.6 MG/DL (ref 8.6–10)
CHLORIDE SERPL-SCNC: 103 MMOL/L (ref 98–107)
CHOLEST SERPL-MCNC: 165 MG/DL
CREAT SERPL-MCNC: 1.04 MG/DL (ref 0.67–1.17)
DEPRECATED HCO3 PLAS-SCNC: 26 MMOL/L (ref 22–29)
GFR SERPL CREATININE-BSD FRML MDRD: 87 ML/MIN/1.73M2
GLUCOSE SERPL-MCNC: 98 MG/DL (ref 70–99)
HDLC SERPL-MCNC: 68 MG/DL
LDLC SERPL CALC-MCNC: 86 MG/DL
NONHDLC SERPL-MCNC: 97 MG/DL
POTASSIUM SERPL-SCNC: 4.3 MMOL/L (ref 3.4–5.3)
PSA SERPL-MCNC: 1.04 NG/ML (ref 0–3.5)
SODIUM SERPL-SCNC: 138 MMOL/L (ref 136–145)
TRIGL SERPL-MCNC: 57 MG/DL

## 2022-12-05 PROCEDURE — G0103 PSA SCREENING: HCPCS | Performed by: INTERNAL MEDICINE

## 2022-12-05 PROCEDURE — 99396 PREV VISIT EST AGE 40-64: CPT | Performed by: INTERNAL MEDICINE

## 2022-12-05 PROCEDURE — 80048 BASIC METABOLIC PNL TOTAL CA: CPT | Performed by: INTERNAL MEDICINE

## 2022-12-05 PROCEDURE — 36415 COLL VENOUS BLD VENIPUNCTURE: CPT | Performed by: INTERNAL MEDICINE

## 2022-12-05 PROCEDURE — 80061 LIPID PANEL: CPT | Performed by: INTERNAL MEDICINE

## 2022-12-05 ASSESSMENT — PAIN SCALES - GENERAL: PAINLEVEL: NO PAIN (0)

## 2022-12-05 NOTE — PATIENT INSTRUCTIONS
Please follow up if you have any further issues.    You may contact me by phone or MyChart if you are worsening or if things are not improving.    ______________________________________________________________________     You can call [phone] during weekday hours to get a hold of our team coordinators if you need to get a message to me.    There are 3 people in our building taking phone calls for me.  Be sure to listen to the prompts to get a hold of them.    You first press 1 for English (press another number for a different language) and then press 2 to get the .    They can then take your message and forward it onto me.    ______________________________________________________________________     Please remember that you can call [phone] ANYTIME to schedule an appointment.     You can schedule appointments 24 hours a day, 7 days a week.      Sometimes the best time to schedule an appointment is after clinic hours when less people are calling in.      Weekends are another option for calling in to schedule appointments.

## 2022-12-05 NOTE — PROGRESS NOTES
[facility]    Comprehensive and complex medical care - Chronic disease management - Shared decision making - Care coordination - Compassionate care    Patient advocacy - Rational deprescribing - Minimally disruptive medicine - Ethical focus - Customized care         Date of Service: 12/5/2022  Primary Provider: Lukas Cruz    Patient Care Team:  Lukas Cruz MD as PCP - General (Internal Medicine)  Lukas Cruz MD as Assigned PCP          Patient's Pharmacy:    [id number] - [place], [place] - [address]  [address]  [facility] [address]  Phone: [phone] Fax: [phone]    [id number] - [place], [place] - [address]  [address]  [place] [address]  Phone: [phone] Fax: [phone]     Patient's Contacts:  Name Home Phone Work Phone Mobile Phone Relationship Lgl MERCY Thornton [phone] Spouse      Patient's Insurance:    Payor: BCBS / Plan: BCBS [place] / Product Type: Indemnity /           ROUTINE PHYSICAL    Active Problem List:  Problem List as of 12/5/2022 Reviewed: 12/5/2022  7:07 AM by Lukas Cruz MD       High    Nonsmoker       Medium    Nephrolithiasis       Low    Normal colonoscopy - 2019        Past Medical History:   Diagnosis Date     Nephrolithiasis      Nonsmoker      S/P colonoscopy 10/17/2019     Tubular adenoma of colon 09/2013     Past Surgical History:   Procedure Laterality Date     AR CYSTO/URETERO W/LITHOTRIPSY &INDWELL STENT INSRT Right 12/13/2018    Procedure: CYSTOSCOPY, RIGHT URETEROSCOPY, LITHOTRIPSY USING LASER, AND URETERAL STENT INSERTION, RIGHT;  Surgeon: Dontrell Atkins MD;  Location: [facility];  Service: Urology     WISDOM TOOTH EXTRACTION       Social History     Occupational History     Not on file   Tobacco Use     Smoking status: Never     Smokeless tobacco:  Never   Vaping Use     Vaping Use: Never used   Substance and Sexual Activity     Alcohol use: Not on file     Drug use: No     Sexual activity: Not on file      Social History     Social History Narrative    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            2 children, 1 son and 1 daughter.  .      Family History   Problem Relation Age of Onset     Hypertension Mother      Hypertension Father      Lung Cancer Father         Small cell     No Known Problems Sister      No Known Problems Sister      No Known Problems Daughter      von-Hippel-Lindau Syndrome Son         Bilateral pheochromocytomas.  Spontaneous mutation.     Family history is otherwise noncontributory.    Current Outpatient Medications   Medication Instructions     cetirizine (ZYRTEC) 10 mg, DAILY      Allergies: Patient has no known allergies.     Immunization History   Administered Date(s) Administered     COVID-19 Vaccine 12+ (Pfizer 2022) 04/23/2022     COVID-19 Vaccine 12+ (Pfizer) 01/18/2021, 02/08/2021, 10/02/2021     COVID-19 Vaccine Bivalent Booster 12+ (Pfizer) 09/18/2022     Flu, Unspecified 10/01/2012, 10/01/2018, 10/01/2020     Influenza (H1N1) 12/01/2009     Influenza (IIV3) PF 10/01/2012     Influenza Vaccine Im 4yrs+ 4 Valent CCIIV4 10/15/2022     TD (ADULT, 7+) 12/03/2018     Tdap (Adacel,Boostrix) 08/18/2008     Tdap (Adult) Unspecified Formulation 08/18/2008     Zoster vaccine recombinant adjuvanted (SHINGRIX) 11/05/2022             Og Boyle is a 52 year old male coming in today for:    Chief Complaint   Patient presents with     Physical      He does not have other issues outside the physical to discuss as well.    Patient is in for a routine physical at this time.  He has no major concerns.    He has had no recurrence of his kidney stones and he has been feeling well.    His blood pressure is doing well  "and his weight is ideal.  He does exercise well and tolerates this well.    He would like screening blood work today.    We reviewed his other issues noted in the assessment but not specifically addressed in the HPI above.     Exam:     Wt Readings from Last 3 Encounters:   12/05/22 69.4 kg (153 lb)   12/18/20 68.1 kg (150 lb 1.6 oz)   12/30/18 65.4 kg (144 lb 3.2 oz)     BP Readings from Last 3 Encounters:   12/05/22 124/86   12/18/20 132/84     /86 (BP Location: Right arm, Patient Position: Sitting, Cuff Size: Adult Regular)   Pulse 63   Temp 98.6  F (37  C) (Oral)   Resp 18   Ht 1.702 m (5' 7\")   Wt 69.4 kg (153 lb)   SpO2 99%   BMI 23.96 kg/m     The patient is in no acute distress.  Mood good.  Insight good.  No skin lesions or nodules of concern.  Ears are clear.  Nose is clear.  Throat is clear.  Neck is supple  and there is no thyromegaly. No cervical, epitrochlear or axillary adenopathy.  Heart regular rate and rhythm.  No murmur present.  Lungs clear to auscultation bilaterally.  Respiratory effort is good.  Abdomen is soft, nontender.  No hepatosplenomegaly.  Extremities show no edema.         Diagnostics:     [facility]   Component Date Value Ref Range Status     Cholesterol 12/18/2020 162  <=199 mg/dL Final     Triglycerides 12/18/2020 78  <=149 mg/dL Final     Direct Measure HDL 12/18/2020 59  >=40 mg/dL Final     LDL Cholesterol Calculated 12/18/2020 87  <=129 mg/dL Final     Patient Fasting > 8hrs? 12/18/2020 Yes   Final     Prostate Specific Antigen Screen 12/18/2020 0.8  0.0 - 2.5 ng/mL Final        No results found for any visits on 12/05/22.     Assessment:     1. Routine physical examination    2. Lipid screening    3. Screening for prostate cancer    4. Screening for metabolic disorder    5. Nephrolithiasis        Plan:     1. Blood work done today.  2. Up-to-date on vaccination and other preventive care.  3. Follow up sooner if issues.     Orders " Placed This Encounter   Procedures     Basic metabolic panel     Prostate Specific Antigen Screen     Lipid panel reflex to direct LDL Fasting           Lukas Cruz MD  General Internal Medicine  [facility]    Return in about 2 years (around 12/5/2024), or if symptoms worsen or fail to improve, for physical.     No future appointments.

## 2022-12-05 NOTE — PROGRESS NOTES
Wt Readings from Last 20 Encounters:   12/05/22 69.4 kg (153 lb)   12/18/20 68.1 kg (150 lb 1.6 oz)   12/30/18 65.4 kg (144 lb 3.2 oz)   12/13/18 62.6 kg (138 lb)   12/03/18 65.3 kg (144 lb)   02/29/16 66.7 kg (147 lb)     BP Readings from Last 20 Encounters:   12/05/22 124/86   12/18/20 132/84      Pulse Readings from Last 20 Encounters:   12/05/22 63   12/18/20 62     SpO2 Readings from Last 20 Encounters:   12/05/22 99%   12/18/20 98%

## 2023-11-06 ENCOUNTER — PATIENT OUTREACH (OUTPATIENT)
Dept: CARE COORDINATION | Facility: CLINIC | Age: 53
End: 2023-11-06
Payer: COMMERCIAL

## 2024-01-11 ASSESSMENT — ENCOUNTER SYMPTOMS
HEMATURIA: 0
CONSTIPATION: 0
HEARTBURN: 0
CHILLS: 0
PARESTHESIAS: 0
MYALGIAS: 0
WEAKNESS: 0
NAUSEA: 0
NERVOUS/ANXIOUS: 0
FREQUENCY: 0
EYE PAIN: 0
HEADACHES: 0
SHORTNESS OF BREATH: 0
DIARRHEA: 0
JOINT SWELLING: 0
SORE THROAT: 0
DIZZINESS: 0
COUGH: 0
PALPITATIONS: 0
FEVER: 0
ARTHRALGIAS: 0
HEMATOCHEZIA: 0
ABDOMINAL PAIN: 0
DYSURIA: 0

## 2024-01-14 ENCOUNTER — HEALTH MAINTENANCE LETTER (OUTPATIENT)
Age: 54
End: 2024-01-14

## 2024-01-15 ENCOUNTER — OFFICE VISIT (OUTPATIENT)
Dept: INTERNAL MEDICINE | Facility: CLINIC | Age: 54
End: 2024-01-15
Payer: COMMERCIAL

## 2024-01-15 VITALS
HEIGHT: 67 IN | BODY MASS INDEX: 25.19 KG/M2 | SYSTOLIC BLOOD PRESSURE: 120 MMHG | WEIGHT: 160.5 LBS | DIASTOLIC BLOOD PRESSURE: 84 MMHG | HEART RATE: 64 BPM | OXYGEN SATURATION: 98 % | RESPIRATION RATE: 16 BRPM

## 2024-01-15 DIAGNOSIS — N20.0 NEPHROLITHIASIS: ICD-10-CM

## 2024-01-15 DIAGNOSIS — Z00.00 ROUTINE PHYSICAL EXAMINATION: Primary | ICD-10-CM

## 2024-01-15 DIAGNOSIS — Z13.228 SCREENING FOR METABOLIC DISORDER: ICD-10-CM

## 2024-01-15 DIAGNOSIS — Z13.220 LIPID SCREENING: ICD-10-CM

## 2024-01-15 DIAGNOSIS — Z12.5 SCREENING FOR PROSTATE CANCER: ICD-10-CM

## 2024-01-15 LAB
ANION GAP SERPL CALCULATED.3IONS-SCNC: 11 MMOL/L (ref 7–15)
BUN SERPL-MCNC: 15.4 MG/DL (ref 6–20)
CALCIUM SERPL-MCNC: 9.2 MG/DL (ref 8.6–10)
CHLORIDE SERPL-SCNC: 103 MMOL/L (ref 98–107)
CHOLEST SERPL-MCNC: 160 MG/DL
CREAT SERPL-MCNC: 1.1 MG/DL (ref 0.67–1.17)
DEPRECATED HCO3 PLAS-SCNC: 25 MMOL/L (ref 22–29)
EGFRCR SERPLBLD CKD-EPI 2021: 81 ML/MIN/1.73M2
FASTING STATUS PATIENT QL REPORTED: YES
GLUCOSE SERPL-MCNC: 100 MG/DL (ref 70–99)
HDLC SERPL-MCNC: 59 MG/DL
LDLC SERPL CALC-MCNC: 87 MG/DL
NONHDLC SERPL-MCNC: 101 MG/DL
POTASSIUM SERPL-SCNC: 4.4 MMOL/L (ref 3.4–5.3)
PSA SERPL DL<=0.01 NG/ML-MCNC: 1.22 NG/ML (ref 0–3.5)
SODIUM SERPL-SCNC: 139 MMOL/L (ref 135–145)
TRIGL SERPL-MCNC: 71 MG/DL

## 2024-01-15 PROCEDURE — 80048 BASIC METABOLIC PNL TOTAL CA: CPT | Performed by: INTERNAL MEDICINE

## 2024-01-15 PROCEDURE — G0103 PSA SCREENING: HCPCS | Performed by: INTERNAL MEDICINE

## 2024-01-15 PROCEDURE — 99396 PREV VISIT EST AGE 40-64: CPT | Performed by: INTERNAL MEDICINE

## 2024-01-15 PROCEDURE — 36415 COLL VENOUS BLD VENIPUNCTURE: CPT | Performed by: INTERNAL MEDICINE

## 2024-01-15 PROCEDURE — 80061 LIPID PANEL: CPT | Performed by: INTERNAL MEDICINE

## 2024-01-15 ASSESSMENT — PAIN SCALES - GENERAL: PAINLEVEL: NO PAIN (0)

## 2024-01-15 NOTE — PATIENT INSTRUCTIONS
Continue regular physicals every 1-2 years per preference.    Blood work done today and we will notify you with the results.    [facility] will contact you for colonoscopy in later 2024.

## 2024-01-15 NOTE — PROGRESS NOTES
[facility]    Comprehensive and complex medical care - Chronic disease management - Shared decision making - Care coordination - Compassionate care    Patient advocacy - Rational deprescribing - Minimally disruptive medicine - Ethical focus - Customized care         Date of Service: 1/15/2024  Primary Provider: Lukas Cruz    Patient Care Team:  Lukas Cruz MD as PCP - General (Internal Medicine)  Lukas Cruz MD as Assigned PCP          Patient's Pharmacy:    [id number] - [place], [place] - [address]  [address]  [facility] [address]  Phone: [phone] Fax: [phone]    [id number] - [place], [place] - [address]  [address]  [place] [address]  Phone: [phone] Fax: [phone]     Patient's Contacts:  Name Home Phone Work Phone Mobile Phone Relationship Lgl MERCY Thornton   [phone] Spouse      Patient's Insurance:    Payor: BCBS / Plan: [place]BS [place] / Product Type: Indemnity /            ROUTINE PHYSICAL    Active Problem List:  Problem List as of 1/15/2024 Reviewed: 1/15/2024  7:27 AM by Lukas Cruz MD         High    Nonsmoker       Medium    Nephrolithiasis       Low    Normal colonoscopy - 2019        Past Medical History:   Diagnosis Date    Nephrolithiasis     Nonsmoker     S/P colonoscopy 10/17/2019    Tubular adenoma of colon 09/2013     Past Surgical History:   Procedure Laterality Date    [place] CYSTO/URETERO W/LITHOTRIPSY &INDWELL STENT INSRT Right 12/13/2018    Procedure: CYSTOSCOPY, RIGHT URETEROSCOPY, LITHOTRIPSY USING LASER, AND URETERAL STENT INSERTION, RIGHT;  Surgeon: Dontrell Atkins MD;  Location: [facility];  Service: Urology    WISDOM TOOTH EXTRACTION       Social History     Occupational History    Not on file   Tobacco Use    Smoking status: Never    Smokeless tobacco: Never   Vaping Use     Vaping Use: Never used   Substance and Sexual Activity    Alcohol use: Not on file    Drug use: No    Sexual activity: Not on file      Social History     Social History Narrative    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            2 children, 1 son and 1 daughter.  .      Family History   Problem Relation Age of Onset    Hypertension Mother     Hypertension Father     Lung Cancer Father         Small cell    No Known Problems Sister     No Known Problems Sister     No Known Problems Daughter     von-Hippel-Lindau Syndrome Son         Bilateral pheochromocytomas.  Spontaneous mutation.     Family history is otherwise noncontributory.    Current Outpatient Medications   Medication Instructions    cetirizine (ZYRTEC) 10 mg, DAILY      Allergies: Patient has no known allergies.     Immunization History   Administered Date(s) Administered    COVID-19 12+ (2023-24) (Pfizer) 10/07/2023    COVID-19 Bivalent 12+ (Pfizer) 09/18/2022    COVID-19 MONOVALENT 12+ (Pfizer) 01/18/2021, 02/08/2021, 10/02/2021    COVID-19 Monovalent 12+ (Pfizer 2022) 04/23/2022    Flu, Unspecified 10/01/2012, 10/01/2018, 10/01/2020, 10/14/2023    Influenza (H1N1) 12/01/2009    Influenza (IIV3) PF 10/01/2012    Influenza Vaccine Im 4yrs+ 4 Valent CCIIV4 10/15/2022    TD,PF 7+ (Tenivac) 12/03/2018    TDAP (Adacel,Boostrix) 08/18/2008    Tdap (Adult) Unspecified Formulation 08/18/2008    Zoster recombinant adjuvanted (SHINGRIX) 11/05/2022, 01/06/2023             Og Boyle is a 53 year old male coming in today for:    Chief Complaint   Patient presents with    Physical          1/15/2024     6:56 AM   Additional Questions   Roomed by Pola COOPER CMA     He does not have other issues outside the physical to discuss as well.    In for a general physical.  We reviewed this.  Weight up some and noted.    Would be considering a  "colonoscopy this fall again and [facility] will likely contact related to this.    Would like prostate cancer screening.    We reviewed his other issues noted in the assessment but not specifically addressed in the HPI above.     Exam:     Wt Readings from Last 3 Encounters:   01/15/24 72.8 kg (160 lb 8 oz)   12/05/22 69.4 kg (153 lb)   12/18/20 68.1 kg (150 lb 1.6 oz)     BP Readings from Last 3 Encounters:   01/15/24 120/84   12/05/22 124/86   12/18/20 132/84     /84 (BP Location: Right arm, Patient Position: Sitting, Cuff Size: Adult Regular)   Pulse 64   Resp 16   Ht 1.702 m (5' 7\")   Wt 72.8 kg (160 lb 8 oz)   SpO2 98%   BMI 25.14 kg/m     The patient is in no acute distress.  Mood good.  Insight good.  No skin lesions or nodules of concern.  Ears are clear.  Nose is clear.  Throat is clear.  Neck is supple  and there is no thyromegaly. No cervical, epitrochlear or axillary adenopathy.  Heart regular rate and rhythm.  No murmur present.  Lungs clear to auscultation bilaterally.  Respiratory effort is good.  Abdomen is soft, nontender.  No hepatosplenomegaly.  Extremities show no edema.        Diagnostics:     Office Visit on 12/05/2022   Component Date Value Ref Range Status    Sodium 12/05/2022 138  136 - 145 mmol/L Final    Potassium 12/05/2022 4.3  3.4 - 5.3 mmol/L Final    Chloride 12/05/2022 103  98 - 107 mmol/L Final    Carbon Dioxide (CO2) 12/05/2022 26  22 - 29 mmol/L Final    Anion Gap 12/05/2022 9  7 - 15 mmol/L Final    Urea Nitrogen 12/05/2022 13.2  6.0 - 20.0 mg/dL Final    Creatinine 12/05/2022 1.04  0.67 - 1.17 mg/dL Final    Calcium 12/05/2022 8.6  8.6 - 10.0 mg/dL Final    Glucose 12/05/2022 98  70 - 99 mg/dL Final    GFR Estimate 12/05/2022 87  >60 mL/min/1.73m2 Final    Effective December 21, 2021 eGFRcr in adults is calculated using the 2021 CKD-EPI creatinine equation which includes age and gender (Loretta et al., NEJM, DOI: 10.1056/NDRNux8804775)    Prostate " Specific Antigen Screen 12/05/2022 1.04  0.00 - 3.50 ng/mL Final    Cholesterol 12/05/2022 165  <200 mg/dL Final    Triglycerides 12/05/2022 57  <150 mg/dL Final    Direct Measure HDL 12/05/2022 68  >=40 mg/dL Final    LDL Cholesterol Calculated 12/05/2022 86  <=100 mg/dL Final    Non HDL Cholesterol 12/05/2022 97  <130 mg/dL Final        No results found for any visits on 01/15/24.     Assessment:     1. Routine physical examination    2. Lipid screening    3. Screening for metabolic disorder    4. Screening for prostate cancer    5. Nephrolithiasis        Plan:     Check lab work today.     Up to date on vaccination.  Consider colonoscopy later this year.  Continue current medications otherwise.  Follow up sooner if issues.    Orders Placed This Encounter   Procedures    Basic metabolic panel    Prostate Specific Antigen Screen    Lipid panel reflex to direct LDL Fasting           Lukas Cruz MD  General Internal Medicine  [facility]    Return in about 2 years (around 1/15/2026) for physical.     No future appointments.

## 2024-11-04 ENCOUNTER — TRANSFERRED RECORDS (OUTPATIENT)
Dept: HEALTH INFORMATION MANAGEMENT | Facility: CLINIC | Age: 54
End: 2024-11-04
Payer: COMMERCIAL

## 2024-12-16 ENCOUNTER — PATIENT OUTREACH (OUTPATIENT)
Dept: CARE COORDINATION | Facility: CLINIC | Age: 54
End: 2024-12-16
Payer: COMMERCIAL

## 2025-01-29 SDOH — HEALTH STABILITY: PHYSICAL HEALTH: ON AVERAGE, HOW MANY MINUTES DO YOU ENGAGE IN EXERCISE AT THIS LEVEL?: 40 MIN

## 2025-01-29 SDOH — HEALTH STABILITY: PHYSICAL HEALTH: ON AVERAGE, HOW MANY DAYS PER WEEK DO YOU ENGAGE IN MODERATE TO STRENUOUS EXERCISE (LIKE A BRISK WALK)?: 5 DAYS

## 2025-01-29 ASSESSMENT — SOCIAL DETERMINANTS OF HEALTH (SDOH): HOW OFTEN DO YOU GET TOGETHER WITH FRIENDS OR RELATIVES?: ONCE A WEEK

## 2025-02-03 ENCOUNTER — OFFICE VISIT (OUTPATIENT)
Dept: INTERNAL MEDICINE | Facility: CLINIC | Age: 55
End: 2025-02-03
Payer: COMMERCIAL

## 2025-02-03 VITALS
SYSTOLIC BLOOD PRESSURE: 134 MMHG | RESPIRATION RATE: 16 BRPM | DIASTOLIC BLOOD PRESSURE: 92 MMHG | TEMPERATURE: 97.6 F | WEIGHT: 157 LBS | HEIGHT: 67 IN | OXYGEN SATURATION: 98 % | HEART RATE: 56 BPM | BODY MASS INDEX: 24.64 KG/M2

## 2025-02-03 DIAGNOSIS — Z13.220 LIPID SCREENING: ICD-10-CM

## 2025-02-03 DIAGNOSIS — Z13.228 SCREENING FOR METABOLIC DISORDER: ICD-10-CM

## 2025-02-03 DIAGNOSIS — R03.0 ELEVATED BLOOD PRESSURE READING: ICD-10-CM

## 2025-02-03 DIAGNOSIS — Z00.00 ROUTINE PHYSICAL EXAMINATION: Primary | ICD-10-CM

## 2025-02-03 DIAGNOSIS — Z12.5 SCREENING FOR PROSTATE CANCER: ICD-10-CM

## 2025-02-03 LAB
ANION GAP SERPL CALCULATED.3IONS-SCNC: 13 MMOL/L (ref 7–15)
BUN SERPL-MCNC: 14.3 MG/DL (ref 6–20)
CALCIUM SERPL-MCNC: 9.3 MG/DL (ref 8.8–10.4)
CHLORIDE SERPL-SCNC: 102 MMOL/L (ref 98–107)
CHOLEST SERPL-MCNC: 178 MG/DL
CREAT SERPL-MCNC: 1.03 MG/DL (ref 0.67–1.17)
EGFRCR SERPLBLD CKD-EPI 2021: 87 ML/MIN/1.73M2
FASTING STATUS PATIENT QL REPORTED: YES
FASTING STATUS PATIENT QL REPORTED: YES
GLUCOSE SERPL-MCNC: 103 MG/DL (ref 70–99)
HCO3 SERPL-SCNC: 24 MMOL/L (ref 22–29)
HDLC SERPL-MCNC: 61 MG/DL
LDLC SERPL CALC-MCNC: 94 MG/DL
NONHDLC SERPL-MCNC: 117 MG/DL
POTASSIUM SERPL-SCNC: 4.3 MMOL/L (ref 3.4–5.3)
PSA SERPL DL<=0.01 NG/ML-MCNC: 1.39 NG/ML (ref 0–3.5)
SODIUM SERPL-SCNC: 139 MMOL/L (ref 135–145)
TRIGL SERPL-MCNC: 116 MG/DL
TSH SERPL DL<=0.005 MIU/L-ACNC: 2.86 UIU/ML (ref 0.3–4.2)

## 2025-02-03 PROCEDURE — 90471 IMMUNIZATION ADMIN: CPT | Performed by: INTERNAL MEDICINE

## 2025-02-03 PROCEDURE — 80048 BASIC METABOLIC PNL TOTAL CA: CPT | Performed by: INTERNAL MEDICINE

## 2025-02-03 PROCEDURE — 80061 LIPID PANEL: CPT | Performed by: INTERNAL MEDICINE

## 2025-02-03 PROCEDURE — 84443 ASSAY THYROID STIM HORMONE: CPT | Performed by: INTERNAL MEDICINE

## 2025-02-03 PROCEDURE — 90677 PCV20 VACCINE IM: CPT | Performed by: INTERNAL MEDICINE

## 2025-02-03 PROCEDURE — G0103 PSA SCREENING: HCPCS | Performed by: INTERNAL MEDICINE

## 2025-02-03 PROCEDURE — 99396 PREV VISIT EST AGE 40-64: CPT | Mod: 25 | Performed by: INTERNAL MEDICINE

## 2025-02-03 PROCEDURE — 36415 COLL VENOUS BLD VENIPUNCTURE: CPT | Performed by: INTERNAL MEDICINE

## 2025-02-03 NOTE — PATIENT INSTRUCTIONS
Lifestyle modifications to reduce blood pressure    1. Lose weight.  A weight loss of 20 pounds can help reduce your blood pressure as much as 5-20 points.    2. Aerobic exercise.  30 minutes of aerobic exercise (for example, walking briskly, swimming, biking, running) on most days of the week can reduce blood pressure as much as 4-9 points.  You should also try to walk 1 mile per day above your current activity level.    3. Limit alcohol intake.  If you are male, you should consume no more than 2 mixed drinks, two 12-ounce cans of beer, or two 4-ounce glasses of wine daily.  If you are female, you should consume half this amount.  This can decrease blood pressure by 2-4 points.    4. Limit salt intake.  Keeping salt intake less than 2.4 grams a day may help some people drop their blood pressure by 2-8 points.    5. Consider the DASH diet.  A diet rich in fruits, vegetables, and low-fat dairy and which emphasizes reduced saturated and total fat might decrease blood pressure by 4-14 points.    Also, while not studied in the past, management of stress and anxiety can often have positive effects on blood pressure.        The patient is a 12y Male complaining of flu-like symptoms.

## 2025-02-03 NOTE — PROGRESS NOTES
[facility]    Comprehensive and complex medical care - Chronic disease management - Shared decision making - Care coordination - Compassionate care    Patient advocacy - Rational deprescribing - Minimally disruptive medicine - Ethical focus - Customized care         Date of Service: 2/3/2025  Primary Provider: Lukas Cruz    Patient Care Team:  Lukas Cruz MD as PCP - General (Internal Medicine)  Lukas Cruz MD as Assigned PCP          Patient's Pharmacy:    [id number] - [place], [place] - [address]  [address]  [facility] [address]  Phone: [phone] Fax: [phone]    [id number] - [place], [place] - [address]  [address]  [place] [address]  Phone: [phone] Fax: [phone]     Patient's Contacts:  Name Home Phone Work Phone Mobile Phone Relationship Lgl Nam TRANMOBERONICA   [phone] Spouse      Patient's Insurance:    Payor: MEDICA / Plan: MEDICA VANTAGEPLUS FULLY INSURED / Product Type: HMO /            ROUTINE PHYSICAL    Active Problem List:  Problem List as of 2/3/2025 Reviewed: 1/15/2024  7:27 AM by Lukas Cruz MD         High    Nonsmoker       Medium    Nephrolithiasis       Low    Normal colonoscopy - 2019        Past Medical History:   Diagnosis Date    Nephrolithiasis     Nonsmoker     S/P colonoscopy 10/17/2019    Tubular adenoma of colon 09/2013     Past Surgical History:   Procedure Laterality Date    DC CYSTO/URETERO W/LITHOTRIPSY &INDWELL STENT INSRT Right 12/13/2018    Procedure: CYSTOSCOPY, RIGHT URETEROSCOPY, LITHOTRIPSY USING LASER, AND URETERAL STENT INSERTION, RIGHT;  Surgeon: Dontrell Atkins MD;  [facility]: 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.            2 children, 1 son and 1 daughter.  .      Family History   Problem Relation Age of Onset    Hypertension Mother     Hypothyroidism Mother     Hypertension Father     Lung Cancer Father         Small cell    Hypertension Sister     No Known Problems Sister     No Known Problems Daughter     von-Hippel-Lindau Syndrome Son         Bilateral pheochromocytomas.  Spontaneous mutation.     Family history is otherwise noncontributory.    Current Outpatient Medications   Medication Instructions    cetirizine (ZYRTEC) 10 mg, DAILY      Allergies: Patient has no known allergies.     Immunization History   Administered Date(s) Administered    COVID-19 12+ (Pfizer) 10/07/2023, 10/19/2024    COVID-19 Bivalent 12+ (Pfizer) 09/18/2022    COVID-19 MONOVALENT 12+ (Pfizer) 01/18/2021, 02/08/2021, 10/02/2021    COVID-19 Monovalent 12+ (Pfizer 2022) 04/23/2022    Flu, Unspecified 10/01/2012, 10/01/2018, 10/01/2020, 10/14/2023    Influenza (H1N1) 12/01/2009    Influenza (IIV3) PF 10/01/2012    Influenza Vaccine (Flucelvax Quadrivalent) 10/15/2022    Influenza, Split Virus, Trivalent, Pf (Fluzone\Fluarix) 10/19/2024    Influenza,INJ,MDCK,PF,Quad >6mo(Flucelvax) 09/29/2023    Pneumococcal 20 valent Conjugate (Prevnar 20) 02/03/2025    TD,PF 7+ (Tenivac) 12/03/2018    TDAP (Adacel,Boostrix) 08/18/2008    Tdap (Adult) Unspecified Formulation 08/18/2008    Zoster recombinant adjuvanted (SHINGRIX) 11/05/2022, 01/06/2023             Og Boyle is a 54 year old male coming in today for:    Chief Complaint   Patient presents with    Physical          2/3/2025     6:52 AM  "  Additional Questions   Roomed by CARMITA Davey     He does not have other issues outside the physical to discuss as well.    Generally doing well overall.    Blood pressure is higher today than usual.  Does have family history of hypertension.  Exercises 4-5 times a week.  Classes.    Outside blood pressures reviewed.    No symptoms of hypertension.    We reviewed his other issues noted in the assessment but not specifically addressed in the HPI above.     Exam:     Wt Readings from Last 3 Encounters:   02/03/25 71.2 kg (157 lb)   01/15/24 72.8 kg (160 lb 8 oz)   12/05/22 69.4 kg (153 lb)     BP Readings from Last 3 Encounters:   02/03/25 (!) 134/92   01/15/24 120/84   12/05/22 124/86     BP (!) 134/92   Pulse 56   Temp 97.6  F (36.4  C) (Oral)   Resp 16   Ht 1.702 m (5' 7\")   Wt 71.2 kg (157 lb)   SpO2 98%   BMI 24.59 kg/m     The patient is in no acute distress.  Mood good.  Insight good.  No skin lesions or nodules of concern.  Ears are clear.  Nose is clear.  Throat is clear.  Neck is supple  and there is no thyromegaly. No cervical, epitrochlear or axillary adenopathy.  Heart regular rate and rhythm.  No murmur present.  Lungs clear to auscultation bilaterally.  Respiratory effort is good.  Abdomen is soft, nontender.  No hepatosplenomegaly.  Extremities show no edema.         Diagnostics:     Office Visit on 01/15/2024   Component Date Value Ref Range Status    Cholesterol 01/15/2024 160  <200 mg/dL Final    Triglycerides 01/15/2024 71  <150 mg/dL Final    Direct Measure HDL 01/15/2024 59  >=40 mg/dL Final    LDL Cholesterol Calculated 01/15/2024 87  <=100 mg/dL Final    Non HDL Cholesterol 01/15/2024 101  <130 mg/dL Final    Patient Fasting > 8hrs? 01/15/2024 Yes   Final    Prostate Specific Antigen Screen 01/15/2024 1.22  0.00 - 3.50 ng/mL Final    Sodium 01/15/2024 139  135 - 145 mmol/L Final    Reference intervals for this test were updated on 09/26/2023 to more accurately reflect our healthy " population. There may be differences in the flagging of prior results with similar values performed with this method. Interpretation of those prior results can be made in the context of the updated reference intervals.     Potassium 01/15/2024 4.4  3.4 - 5.3 mmol/L Final    Chloride 01/15/2024 103  98 - 107 mmol/L Final    Carbon Dioxide (CO2) 01/15/2024 25  22 - 29 mmol/L Final    Anion Gap 01/15/2024 11  7 - 15 mmol/L Final    Urea Nitrogen 01/15/2024 15.4  6.0 - 20.0 mg/dL Final    Creatinine 01/15/2024 1.10  0.67 - 1.17 mg/dL Final    GFR Estimate 01/15/2024 81  >60 mL/min/1.73m2 Final    Calcium 01/15/2024 9.2  8.6 - 10.0 mg/dL Final    Glucose 01/15/2024 100 (H)  70 - 99 mg/dL Final        No results found for any visits on 02/03/25.     Assessment:     1. Routine physical examination    2. Screening for metabolic disorder    3. Screening for prostate cancer    4. Lipid screening    5. Elevated blood pressure reading        Plan:     Check lab work today.     PREVNAR vaccination done today.  Follow blood pressure as an outpatient and consider treatment.  Amlodipine (Norvasc)? Angiotensin converting enzyme (ACE) inhibitor or AT2.  Continue current medications otherwise.  Follow up sooner if issues.    Orders Placed This Encounter   Procedures    Pneumococcal 20 Valent Conjugate (PCV20)    Basic metabolic panel    Lipid panel reflex to direct LDL Fasting    Prostate Specific Antigen Screen    TSH with free T4 reflex        The longitudinal plan of care for the diagnoses and conditions as documented were addressed during this visit. Due to the added complexity in care, I will continue to support Og in the subsequent management and with ongoing continuity of care.        Lukas Cruz MD  General Internal Medicine  [facility]    Return in about 1 year (around 2/3/2026), or if symptoms worsen or fail to improve, for visit and blood work, physical.     No future appointments.     Wt Readings from Last 20 Encounters:   02/03/25 71.2 kg (157 lb)   01/15/24 72.8 kg (160 lb 8 oz)   12/05/22 69.4 kg (153 lb)   12/18/20 68.1 kg (150 lb 1.6 oz)   12/30/18 65.4 kg (144 lb 3.2 oz)   12/13/18 62.6 kg (138 lb)   12/03/18 65.3 kg (144 lb)   02/29/16 66.7 kg (147 lb)     BP Readings from Last 20 Encounters:   02/03/25 (!) 144/84   01/15/24 120/84   12/05/22 124/86   12/18/20 132/84      Pulse Readings from Last 20 Encounters:   02/03/25 56   01/15/24 64   12/05/22 63   12/18/20 62     SpO2 Readings from Last 20 Encounters:   02/03/25 98%   01/15/24 98%   12/05/22 99%   12/18/20 98%

## 2025-02-03 NOTE — PROGRESS NOTES
Preventive Care Visit  [facility]  Lukas Cruz MD, Internal Medicine  Feb 3, 2025  {Provider  Link to SmartSet :738962}    {PROVIDER CHARTING PREFERENCE:130099}    Gunnar Foley is a 54 year old, presenting for the following:  Physical        2/3/2025     6:52 AM   Additional Questions   Roomed by CARMITA Davey          HPI  ***  {MA/LPN/RN Pre-Provider Visit Orders- hCG/UA/Strep (Optional):597415}  {SUPERLIST (Optional):565311}  {additonal problems for provider to add (Optional):030900}  Health Care Directive  Patient does not have a Health Care Directive: {ADVANCE_DIRECTIVE_STATUS:088473}      1/29/2025   General Health   How would you rate your overall physical health? Excellent   Feel stress (tense, anxious, or unable to sleep) Not at all         1/29/2025   Nutrition   Three or more servings of calcium each day? Yes   Diet: Regular (no restrictions)   How many servings of fruit and vegetables per day? (!) 2-3   How many sweetened beverages each day? 0-1         1/29/2025   Exercise   Days per week of moderate/strenous exercise 5 days   Average minutes spent exercising at this level 40 min         1/29/2025   Social Factors   Frequency of gathering with friends or relatives Once a week   Worry food won't last until get money to buy more No   Food not last or not have enough money for food? No   Do you have housing? (Housing is defined as stable permanent housing and does not include staying ouside in a car, in a tent, in an abandoned building, in an overnight shelter, or couch-surfing.) Yes   Are you worried about losing your housing? No   Lack of transportation? No   Unable to get utilities (heat,electricity)? No         1/29/2025   Fall Risk   Fallen 2 or more times in the past year? No   Trouble with walking or balance? No          1/29/2025   Dental   Dentist two times every year? Yes         1/29/2025   TB Screening   Were you born outside of the [place]? No         Today's PHQ-2  "Score:       2/2/2025    10:12 AM   PHQ-2 ( 1999 [employer])   Q1: Little interest or pleasure in doing things 0   Q2: Feeling down, depressed or hopeless 0   PHQ-2 Score 0    Q1: Little interest or pleasure in doing things Not at all   Q2: Feeling down, depressed or hopeless Not at all   PHQ-2 Score 0       Patient-reported           1/29/2025   Substance Use   Alcohol more than 3/day or more than 7/wk No   Do you use any other substances recreationally? No     Social History     Tobacco Use    Smoking status: Never    Smokeless tobacco: Never   Vaping Use    Vaping status: Never Used   Substance Use Topics    Drug use: No     {Provider  If there are gaps in the social history shown above, please follow the link to update and then refresh the note Link to Social and Substance History :445949}      1/29/2025   STI Screening   New sexual partner(s) since last STI/HIV test? No   ASCVD Risk   The 10-year ASCVD risk score (Dominique EUCEDA, et al., 2019) is: 3.3%    Values used to calculate the score:      Age: 54 years      Sex: Male      Is Non- : No      Diabetic: No      Tobacco smoker: No      Systolic Blood Pressure: 144 mmHg      Is BP treated: No      HDL Cholesterol: 59 mg/dL      Total Cholesterol: 160 mg/dL    {Link to Fracture Risk Assessment Tool (Optional):483772}    {Provider  REQUIRED FOR AWV Use the storyboard to review patient history, after sections have been marked as reviewed, refresh note to capture documentation:401849}   Reviewed and updated as needed this visit by Provider                    {HISTORY OPTIONS (Optional):583860}    {ROS Picklists (Optional):680529}     Objective    Exam  BP (!) 144/84   Pulse 56   Temp 97.6  F (36.4  C) (Oral)   Resp 16   Ht 1.702 m (5' 7\")   Wt 71.2 kg (157 lb)   SpO2 98%   BMI 24.59 kg/m     Estimated body mass index is 24.59 kg/m  as calculated from the following:    Height as of this encounter: 1.702 m (5' 7\").    Weight as of " this encounter: 71.2 kg (157 lb).    Physical Exam  {Exam Choices (Optional):228562}        Signed Electronically by: Lukas Cruz MD  {Email feedback regarding this note to [email or web address]   :261056}